# Patient Record
(demographics unavailable — no encounter records)

---

## 2024-10-17 NOTE — ASSESSMENT
[FreeTextEntry1] :    1.  Rectal Bleeding      GEORGE--> + external thrombosed hemorrhoidal, tender/ still tense w exposed partially protruding clot     Hemorrhoid was cleansed w betadine sol x 3, medium clot was extracted w forceps & packed w gauze     Patient felt better less pain  Hemorrhoids: external Thrombosed * Discussed the  potential complications of thrombosis,  pain,  infection,  swelling, itching,  bleeding  Recommendations:  * Moderate- High  Fiber Diet was reviewed and emphasized * 6  --  8 cups of decaffeinated fluid daily was emphasized  * Sitz Bathes as needed  will cover w Augmentum 874 bid  * for  Colorectal surgical evaluation        2. Colorectal  Neoplasia  Screening:  never had a colonoscopy   Utilizing teaching posters and anatomical models the following were discussed and emphasized with the patient in detail: * Discussed the pre-malignant potential of polyps * Discussed the importance of f/u surveillance / screening colonoscopy * moderate-High  Fiber Diet was reviewed and emphasized * Anti-oxidants and ASA/NSAID Therapy emphasized * Given age > 40-51 yo * Recommend Colonoscopy  to  R/O  Colonic Neoplasia-- in 2024  will review prep on f/u appt

## 2024-10-17 NOTE — PHYSICAL EXAM
[Normal Sphincter Tone] : normal sphincter tone [No Rectal Mass] : no rectal mass [de-identified] : thrombosed external hemorrhoid at 1-2', tense w protruding clot

## 2024-10-23 NOTE — HISTORY OF PRESENT ILLNESS
[FreeTextEntry1] : 49-year-old male pt referred by Dr Franki Yun for hemorrhoids. Dr. Yun had excised a thrombosed hemorrhoid in the office on 10/17/24 and put the pt on Augmentin. Pt still taking it.  Has never had a colonoscopy  Denies a family history of colon cancer or polyps. Pt reports feeling much better Has 2-3 Bm's a day Denies bleeding Denies pain Does not take stool softeners Looking to stay with conservative management will be following up with Dr. Yun in 5 weeks on Dec 3

## 2024-10-23 NOTE — PHYSICAL EXAM
[Abdomen Masses] : No abdominal masses [Abdomen Tenderness] : ~T No ~M abdominal tenderness [No HSM] : no hepatosplenomegaly [No Rash or Lesion] : No rash or lesion [Alert] : alert [Oriented to Person] : oriented to person [Oriented to Place] : oriented to place [Oriented to Time] : oriented to time [Calm] : calm [de-identified] : Soft [de-identified] : External - the thrombosed hemorrhoid has resolved; no pain or swelling. [de-identified] : Normal [de-identified] : Normal [de-identified] : Normal [de-identified] : Normal [de-identified] : Normal Detail Level: Detailed - will send another month of MCN so pt can be on MCN for a full 2 months consistently\\n- will increase tretinoin 0.1% to help with acne scarring.

## 2024-10-23 NOTE — ASSESSMENT
[FreeTextEntry1] : 49 M referred by Dr Yun. He had a thrombosed hemorrhoid, which was evacuated. He is doing well and on exam, the thrombosed hemorrhoid has resolved. Plan: High fiber diet. Sitz baths as needed. Follow up with Dr Yun as scheduled. Will need a colonoscopy - Dr Yun. See again as needed. All questions answered.

## 2024-10-23 NOTE — PHYSICAL EXAM
[Abdomen Masses] : No abdominal masses [Abdomen Tenderness] : ~T No ~M abdominal tenderness [No HSM] : no hepatosplenomegaly [No Rash or Lesion] : No rash or lesion [Alert] : alert [Oriented to Person] : oriented to person [Oriented to Place] : oriented to place [Oriented to Time] : oriented to time [Calm] : calm [de-identified] : Soft [de-identified] : External - the thrombosed hemorrhoid has resolved; no pain or swelling. [de-identified] : Normal [de-identified] : Normal [de-identified] : Normal [de-identified] : Normal [de-identified] : Normal